# Patient Record
Sex: MALE | Race: BLACK OR AFRICAN AMERICAN | NOT HISPANIC OR LATINO | URBAN - METROPOLITAN AREA
[De-identification: names, ages, dates, MRNs, and addresses within clinical notes are randomized per-mention and may not be internally consistent; named-entity substitution may affect disease eponyms.]

---

## 2017-12-08 ENCOUNTER — EMERGENCY (EMERGENCY)
Facility: HOSPITAL | Age: 32
LOS: 1 days | Discharge: HOME | End: 2017-12-08
Admitting: EMERGENCY MEDICINE
Payer: COMMERCIAL

## 2017-12-08 VITALS
HEART RATE: 81 BPM | DIASTOLIC BLOOD PRESSURE: 76 MMHG | TEMPERATURE: 97 F | RESPIRATION RATE: 16 BRPM | OXYGEN SATURATION: 98 % | SYSTOLIC BLOOD PRESSURE: 119 MMHG

## 2017-12-08 VITALS
DIASTOLIC BLOOD PRESSURE: 74 MMHG | OXYGEN SATURATION: 100 % | SYSTOLIC BLOOD PRESSURE: 122 MMHG | TEMPERATURE: 97 F | HEART RATE: 67 BPM | RESPIRATION RATE: 16 BRPM

## 2017-12-08 LAB
ALBUMIN SERPL ELPH-MCNC: 4.6 G/DL — SIGNIFICANT CHANGE UP (ref 3.3–5)
ALP SERPL-CCNC: 94 U/L — SIGNIFICANT CHANGE UP (ref 40–120)
ALT FLD-CCNC: 22 U/L — SIGNIFICANT CHANGE UP (ref 4–41)
APPEARANCE UR: CLEAR — SIGNIFICANT CHANGE UP
AST SERPL-CCNC: 23 U/L — SIGNIFICANT CHANGE UP (ref 4–40)
BACTERIA # UR AUTO: SIGNIFICANT CHANGE UP
BASOPHILS # BLD AUTO: 0.02 K/UL — SIGNIFICANT CHANGE UP (ref 0–0.2)
BASOPHILS NFR BLD AUTO: 0.3 % — SIGNIFICANT CHANGE UP (ref 0–2)
BILIRUB SERPL-MCNC: 0.3 MG/DL — SIGNIFICANT CHANGE UP (ref 0.2–1.2)
BILIRUB UR-MCNC: NEGATIVE — SIGNIFICANT CHANGE UP
BLOOD UR QL VISUAL: NEGATIVE — SIGNIFICANT CHANGE UP
BUN SERPL-MCNC: 14 MG/DL — SIGNIFICANT CHANGE UP (ref 7–23)
CALCIUM SERPL-MCNC: 9.3 MG/DL — SIGNIFICANT CHANGE UP (ref 8.4–10.5)
CHLORIDE SERPL-SCNC: 103 MMOL/L — SIGNIFICANT CHANGE UP (ref 98–107)
CO2 SERPL-SCNC: 26 MMOL/L — SIGNIFICANT CHANGE UP (ref 22–31)
COLOR SPEC: YELLOW — SIGNIFICANT CHANGE UP
CREAT SERPL-MCNC: 0.96 MG/DL — SIGNIFICANT CHANGE UP (ref 0.5–1.3)
EOSINOPHIL # BLD AUTO: 0.39 K/UL — SIGNIFICANT CHANGE UP (ref 0–0.5)
EOSINOPHIL NFR BLD AUTO: 6.5 % — HIGH (ref 0–6)
GLUCOSE SERPL-MCNC: 90 MG/DL — SIGNIFICANT CHANGE UP (ref 70–99)
GLUCOSE UR-MCNC: NEGATIVE — SIGNIFICANT CHANGE UP
HCT VFR BLD CALC: 40.7 % — SIGNIFICANT CHANGE UP (ref 39–50)
HGB BLD-MCNC: 14 G/DL — SIGNIFICANT CHANGE UP (ref 13–17)
IMM GRANULOCYTES # BLD AUTO: 0.01 # — SIGNIFICANT CHANGE UP
IMM GRANULOCYTES NFR BLD AUTO: 0.2 % — SIGNIFICANT CHANGE UP (ref 0–1.5)
KETONES UR-MCNC: NEGATIVE — SIGNIFICANT CHANGE UP
LEUKOCYTE ESTERASE UR-ACNC: NEGATIVE — SIGNIFICANT CHANGE UP
LYMPHOCYTES # BLD AUTO: 2.69 K/UL — SIGNIFICANT CHANGE UP (ref 1–3.3)
LYMPHOCYTES # BLD AUTO: 44.8 % — HIGH (ref 13–44)
MCHC RBC-ENTMCNC: 31.3 PG — SIGNIFICANT CHANGE UP (ref 27–34)
MCHC RBC-ENTMCNC: 34.4 % — SIGNIFICANT CHANGE UP (ref 32–36)
MCV RBC AUTO: 90.8 FL — SIGNIFICANT CHANGE UP (ref 80–100)
MONOCYTES # BLD AUTO: 0.36 K/UL — SIGNIFICANT CHANGE UP (ref 0–0.9)
MONOCYTES NFR BLD AUTO: 6 % — SIGNIFICANT CHANGE UP (ref 2–14)
MUCOUS THREADS # UR AUTO: SIGNIFICANT CHANGE UP
NEUTROPHILS # BLD AUTO: 2.53 K/UL — SIGNIFICANT CHANGE UP (ref 1.8–7.4)
NEUTROPHILS NFR BLD AUTO: 42.2 % — LOW (ref 43–77)
NITRITE UR-MCNC: NEGATIVE — SIGNIFICANT CHANGE UP
NON-SQ EPI CELLS # UR AUTO: <1 — SIGNIFICANT CHANGE UP
NRBC # FLD: 0 — SIGNIFICANT CHANGE UP
OB PNL STL: NEGATIVE — SIGNIFICANT CHANGE UP
PH UR: 6 — SIGNIFICANT CHANGE UP (ref 4.6–8)
PLATELET # BLD AUTO: 173 K/UL — SIGNIFICANT CHANGE UP (ref 150–400)
PMV BLD: 10.3 FL — SIGNIFICANT CHANGE UP (ref 7–13)
POTASSIUM SERPL-MCNC: 4.1 MMOL/L — SIGNIFICANT CHANGE UP (ref 3.5–5.3)
POTASSIUM SERPL-SCNC: 4.1 MMOL/L — SIGNIFICANT CHANGE UP (ref 3.5–5.3)
PROT SERPL-MCNC: 7.2 G/DL — SIGNIFICANT CHANGE UP (ref 6–8.3)
PROT UR-MCNC: 10 MG/DL — SIGNIFICANT CHANGE UP
RBC # BLD: 4.48 M/UL — SIGNIFICANT CHANGE UP (ref 4.2–5.8)
RBC # FLD: 11.9 % — SIGNIFICANT CHANGE UP (ref 10.3–14.5)
RBC CASTS # UR COMP ASSIST: SIGNIFICANT CHANGE UP (ref 0–?)
SODIUM SERPL-SCNC: 140 MMOL/L — SIGNIFICANT CHANGE UP (ref 135–145)
SP GR SPEC: 1.02 — SIGNIFICANT CHANGE UP (ref 1–1.04)
UROBILINOGEN FLD QL: NORMAL MG/DL — SIGNIFICANT CHANGE UP
WBC # BLD: 6 K/UL — SIGNIFICANT CHANGE UP (ref 3.8–10.5)
WBC # FLD AUTO: 6 K/UL — SIGNIFICANT CHANGE UP (ref 3.8–10.5)
WBC UR QL: SIGNIFICANT CHANGE UP (ref 0–?)

## 2017-12-08 PROCEDURE — 74177 CT ABD & PELVIS W/CONTRAST: CPT | Mod: 26

## 2017-12-08 PROCEDURE — 99284 EMERGENCY DEPT VISIT MOD MDM: CPT

## 2017-12-08 RX ORDER — SODIUM CHLORIDE 9 MG/ML
1000 INJECTION INTRAMUSCULAR; INTRAVENOUS; SUBCUTANEOUS ONCE
Qty: 0 | Refills: 0 | Status: COMPLETED | OUTPATIENT
Start: 2017-12-08 | End: 2017-12-08

## 2017-12-08 RX ADMIN — SODIUM CHLORIDE 1000 MILLILITER(S): 9 INJECTION INTRAMUSCULAR; INTRAVENOUS; SUBCUTANEOUS at 22:20

## 2017-12-08 NOTE — ED PROVIDER NOTE - PLAN OF CARE
PLEASE MAKE SURE THAT YOU FOLLOW UP WITH YOUR DOCTOR AS WELL AS SCHEDULE AN APPOITEMENT TO SEE GASTROENTEROLOGIST AS YOU NEEDS COLONOSCOPY. RETURN TO ER FOR INCREASED BLEEDING, WORSENING ABDOMINAL PAIN, FEVER, CHEST PAIN, SHORTNESS OF BREATH OR ANY CONCERNS.

## 2017-12-08 NOTE — ED PROVIDER NOTE - PROGRESS NOTE DETAILS
NOHEMI Euceda - pt signed out to me by NOHEMI henderson at the shift change. BRPR, occ stool neg, hemodynamically stable, CT abd/pelvis to r/o diverticulitis pending; pt currently sleeping comfortably in the bed; will reassess.

## 2017-12-08 NOTE — ED PROVIDER NOTE - CARE PLAN
Principal Discharge DX:	Bright red blood per rectum  Instructions for follow-up, activity and diet:	PLEASE MAKE SURE THAT YOU FOLLOW UP WITH YOUR DOCTOR AS WELL AS SCHEDULE AN APPOITEMENT TO SEE GASTROENTEROLOGIST AS YOU NEEDS COLONOSCOPY. RETURN TO ER FOR INCREASED BLEEDING, WORSENING ABDOMINAL PAIN, FEVER, CHEST PAIN, SHORTNESS OF BREATH OR ANY CONCERNS.

## 2017-12-08 NOTE — ED PROVIDER NOTE - MEDICAL DECISION MAKING DETAILS
32 yr old male with no pmhx presents c/o LLQ abdominal cramping and bloody BM today. Pt reports that he had some LLQ abdominal cramping and then had bloody BM, with blood in the stool and in toilet. Pt still c/o LLQ pain/ cramping. : cbc, cmp, ua, uc, ct abd/ pelvis, occult blood

## 2017-12-08 NOTE — ED ADULT TRIAGE NOTE - CHIEF COMPLAINT QUOTE
Blood in stool this morning, mixed in stool and water.  Pt has picture of stool to be shown to provider.  Abdominal cramping to lower abdomen since this morning

## 2017-12-08 NOTE — ED ADULT NURSE NOTE - OBJECTIVE STATEMENT
pt. received in intake room 5 , A&Ox5 c/o bright red blood in stool earlier today . Pt. appears comfortable. Pt. Vitals as noted, and in no acute distress. Pt. denies vomiting , states he is slight nauseous but refuses any meds. IV placed on left ac , labs drawn and sent. Will continue to monitor while in the ED.

## 2017-12-08 NOTE — ED PROVIDER NOTE - OBJECTIVE STATEMENT
32 yr old male 32 yr old male with no pmhx presents c/o LLQ abdominal cramping and bloody BM today. Pt reports that he had some LLQ abdominal cramping and then had bloody BM, with blood in the stool and in toilet. Pt still c/o LLQ pain/ cramping. Pt denies any fever, chills, n/v/d, or any other symptoms.

## 2017-12-10 LAB
BACTERIA UR CULT: SIGNIFICANT CHANGE UP
SPECIMEN SOURCE: SIGNIFICANT CHANGE UP

## 2018-10-15 ENCOUNTER — EMERGENCY (EMERGENCY)
Facility: HOSPITAL | Age: 33
LOS: 1 days | Discharge: HOME | End: 2018-10-15
Attending: EMERGENCY MEDICINE | Admitting: EMERGENCY MEDICINE
Payer: COMMERCIAL

## 2018-10-15 VITALS
SYSTOLIC BLOOD PRESSURE: 140 MMHG | HEART RATE: 72 BPM | DIASTOLIC BLOOD PRESSURE: 90 MMHG | TEMPERATURE: 98 F | OXYGEN SATURATION: 100 % | RESPIRATION RATE: 18 BRPM

## 2018-10-15 VITALS
SYSTOLIC BLOOD PRESSURE: 115 MMHG | DIASTOLIC BLOOD PRESSURE: 75 MMHG | TEMPERATURE: 98 F | OXYGEN SATURATION: 100 % | HEART RATE: 59 BPM | RESPIRATION RATE: 19 BRPM

## 2018-10-15 LAB
ALBUMIN SERPL ELPH-MCNC: 4.5 G/DL — SIGNIFICANT CHANGE UP (ref 3.3–5)
ALP SERPL-CCNC: 99 U/L — SIGNIFICANT CHANGE UP (ref 40–120)
ALT FLD-CCNC: 26 U/L — SIGNIFICANT CHANGE UP (ref 4–41)
AST SERPL-CCNC: 23 U/L — SIGNIFICANT CHANGE UP (ref 4–40)
BASOPHILS # BLD AUTO: 0.01 K/UL — SIGNIFICANT CHANGE UP (ref 0–0.2)
BASOPHILS NFR BLD AUTO: 0.2 % — SIGNIFICANT CHANGE UP (ref 0–2)
BILIRUB SERPL-MCNC: 0.3 MG/DL — SIGNIFICANT CHANGE UP (ref 0.2–1.2)
BUN SERPL-MCNC: 14 MG/DL — SIGNIFICANT CHANGE UP (ref 7–23)
CALCIUM SERPL-MCNC: 9.7 MG/DL — SIGNIFICANT CHANGE UP (ref 8.4–10.5)
CHLORIDE SERPL-SCNC: 100 MMOL/L — SIGNIFICANT CHANGE UP (ref 98–107)
CO2 SERPL-SCNC: 23 MMOL/L — SIGNIFICANT CHANGE UP (ref 22–31)
CREAT SERPL-MCNC: 0.95 MG/DL — SIGNIFICANT CHANGE UP (ref 0.5–1.3)
EOSINOPHIL # BLD AUTO: 0.36 K/UL — SIGNIFICANT CHANGE UP (ref 0–0.5)
EOSINOPHIL NFR BLD AUTO: 7.6 % — HIGH (ref 0–6)
GLUCOSE SERPL-MCNC: 102 MG/DL — HIGH (ref 70–99)
HCT VFR BLD CALC: 41.8 % — SIGNIFICANT CHANGE UP (ref 39–50)
HGB BLD-MCNC: 14.4 G/DL — SIGNIFICANT CHANGE UP (ref 13–17)
IMM GRANULOCYTES # BLD AUTO: 0.01 # — SIGNIFICANT CHANGE UP
IMM GRANULOCYTES NFR BLD AUTO: 0.2 % — SIGNIFICANT CHANGE UP (ref 0–1.5)
LYMPHOCYTES # BLD AUTO: 1.93 K/UL — SIGNIFICANT CHANGE UP (ref 1–3.3)
LYMPHOCYTES # BLD AUTO: 40.8 % — SIGNIFICANT CHANGE UP (ref 13–44)
MCHC RBC-ENTMCNC: 31.2 PG — SIGNIFICANT CHANGE UP (ref 27–34)
MCHC RBC-ENTMCNC: 34.4 % — SIGNIFICANT CHANGE UP (ref 32–36)
MCV RBC AUTO: 90.7 FL — SIGNIFICANT CHANGE UP (ref 80–100)
MONOCYTES # BLD AUTO: 0.27 K/UL — SIGNIFICANT CHANGE UP (ref 0–0.9)
MONOCYTES NFR BLD AUTO: 5.7 % — SIGNIFICANT CHANGE UP (ref 2–14)
NEUTROPHILS # BLD AUTO: 2.15 K/UL — SIGNIFICANT CHANGE UP (ref 1.8–7.4)
NEUTROPHILS NFR BLD AUTO: 45.5 % — SIGNIFICANT CHANGE UP (ref 43–77)
NRBC # FLD: 0.02 — SIGNIFICANT CHANGE UP
PLATELET # BLD AUTO: 172 K/UL — SIGNIFICANT CHANGE UP (ref 150–400)
PMV BLD: 10.3 FL — SIGNIFICANT CHANGE UP (ref 7–13)
POTASSIUM SERPL-MCNC: 4.4 MMOL/L — SIGNIFICANT CHANGE UP (ref 3.5–5.3)
POTASSIUM SERPL-SCNC: 4.4 MMOL/L — SIGNIFICANT CHANGE UP (ref 3.5–5.3)
PROT SERPL-MCNC: 7.4 G/DL — SIGNIFICANT CHANGE UP (ref 6–8.3)
RBC # BLD: 4.61 M/UL — SIGNIFICANT CHANGE UP (ref 4.2–5.8)
RBC # FLD: 11.7 % — SIGNIFICANT CHANGE UP (ref 10.3–14.5)
SODIUM SERPL-SCNC: 137 MMOL/L — SIGNIFICANT CHANGE UP (ref 135–145)
WBC # BLD: 4.73 K/UL — SIGNIFICANT CHANGE UP (ref 3.8–10.5)
WBC # FLD AUTO: 4.73 K/UL — SIGNIFICANT CHANGE UP (ref 3.8–10.5)

## 2018-10-15 PROCEDURE — 99284 EMERGENCY DEPT VISIT MOD MDM: CPT | Mod: 25

## 2018-10-15 PROCEDURE — 70450 CT HEAD/BRAIN W/O DYE: CPT | Mod: 26

## 2018-10-15 RX ORDER — SODIUM CHLORIDE 9 MG/ML
1000 INJECTION INTRAMUSCULAR; INTRAVENOUS; SUBCUTANEOUS ONCE
Qty: 0 | Refills: 0 | Status: COMPLETED | OUTPATIENT
Start: 2018-10-15 | End: 2018-10-15

## 2018-10-15 RX ORDER — METOCLOPRAMIDE HCL 10 MG
10 TABLET ORAL ONCE
Qty: 0 | Refills: 0 | Status: COMPLETED | OUTPATIENT
Start: 2018-10-15 | End: 2018-10-15

## 2018-10-15 RX ORDER — KETOROLAC TROMETHAMINE 30 MG/ML
15 SYRINGE (ML) INJECTION ONCE
Qty: 0 | Refills: 0 | Status: DISCONTINUED | OUTPATIENT
Start: 2018-10-15 | End: 2018-10-15

## 2018-10-15 RX ADMIN — Medication 15 MILLIGRAM(S): at 04:19

## 2018-10-15 RX ADMIN — Medication 10 MILLIGRAM(S): at 04:20

## 2018-10-15 RX ADMIN — SODIUM CHLORIDE 1000 MILLILITER(S): 9 INJECTION INTRAMUSCULAR; INTRAVENOUS; SUBCUTANEOUS at 04:20

## 2018-10-15 NOTE — ED ADULT TRIAGE NOTE - CHIEF COMPLAINT QUOTE
c/o severe headache which started around this afternoon but now is 10/10. c/o nausea. Denies photophobia or fever. Had hit his head onto a table last week. Pt is uncomfortable in triage. c/o severe headache which started around this afternoon but now is 10/10. c/o nausea. Denies photophobia or fever. Had hit his head onto a table last week. Pt is uncomfortable in triage. took Ibuprofen around 7pm and two aleve around 0145 with no relief. No PMH

## 2018-10-15 NOTE — ED PROVIDER NOTE - PLAN OF CARE
Thank you for visiting our Emergency Department, it has been a pleasure taking part in your healthcare.   You have been seen for a headache. Your results showed that you may have had a migraine. Your CT results and blood work were normal.  You were treated with Toradol and Reglan.  A copy of resulted labs, imaging, and findings have been provided to you.     Follow up with your primary care provider in 24-48 hours. We have provided you with a list of numbers for neurology.   Please return to the Emergency Department if you experience any new/worsening symptoms, including but are not limited to: unrelenting nausea, vomiting, fever, chills, dizziness, syncope, headache that does not resolve, loss of sensation, loss of motor function, or any other concerning symptoms.

## 2018-10-15 NOTE — ED PROVIDER NOTE - PROGRESS NOTE DETAILS
EM PGY1 Nisa Phillips MD: Pt assessed at beside. Pt resting comfortably, pain controlled, pt questions answered. Vital signs stable. Will d/c with PCP/neurology f/u. Follow up instructions given, importance of follow up emphasized, return to ED parameters reviewed and patient verbalized understanding.  All questions answered, all concerns addressed.

## 2018-10-15 NOTE — ED ADULT NURSE NOTE - OBJECTIVE STATEMENT
34 y/o M received in spot 5.  Pt is A&Ox4.  Pt c/o R temporal HA x 2 days not relieved with aleve or Motrin.  Denies fever/chills, N/V/D, dizziness, weakness.  fvr, neck pain, photophobia, or blurry vision.  18 R Ac.

## 2018-10-15 NOTE — ED PROVIDER NOTE - ATTENDING CONTRIBUTION TO CARE
pt with R sided HA frontal/temporal lesion - gradual onset and got significantly worse through the course of the day and became intolerable.  No photophobia and not sudden onset.  No weakness.    Gen: Well appearing in NAD  Head: NC/AT  Neck: trachea midline  Resp:  No distress  Ext: no deformities  Neuro:  A&O appears non focal  Skin:  Warm and dry as visualized  Psych:  Normal affect and mood    Pt with HA.  No real history of HA but no red flags for SAH.  Will treat symptomatically and if symptoms persist discuss imagining with patient,

## 2018-10-15 NOTE — ED ADULT NURSE NOTE - CHIEF COMPLAINT QUOTE
c/o severe headache which started around this afternoon but now is 10/10. c/o nausea. Denies photophobia or fever. Had hit his head onto a table last week. Pt is uncomfortable in triage. took Ibuprofen around 7pm and two aleve around 0145 with no relief. No PMH

## 2018-10-15 NOTE — ED PROVIDER NOTE - OBJECTIVE STATEMENT
33y F no hx p/w gradual onset unprovoked nonrad R temporal HA not responsive to Aleve at 3pm & ibuprofen at 7pm.  No fvr, neck pain, photophobia, phonophobia, tearing.  +intermittent nausea or R blurry vision, but none now. No balance issues.

## 2018-10-15 NOTE — ED PROVIDER NOTE - CARE PLAN
Principal Discharge DX:	Migraine  Assessment and plan of treatment:	Thank you for visiting our Emergency Department, it has been a pleasure taking part in your healthcare.   You have been seen for a headache. Your results showed that you may have had a migraine. Your CT results and blood work were normal.  You were treated with Toradol and Reglan.  A copy of resulted labs, imaging, and findings have been provided to you.     Follow up with your primary care provider in 24-48 hours. We have provided you with a list of numbers for neurology.   Please return to the Emergency Department if you experience any new/worsening symptoms, including but are not limited to: unrelenting nausea, vomiting, fever, chills, dizziness, syncope, headache that does not resolve, loss of sensation, loss of motor function, or any other concerning symptoms.

## 2019-05-07 NOTE — ED ADULT NURSE NOTE - NS ED NOTE ABUSE RESPONSE YN
Past Medical History:   Diagnosis Date    Diabetes mellitus     High cholesterol     Hypertension     Prostate cancer        Past Surgical History:   Procedure Laterality Date    APPLICATION, GRAFT, SKIN, FULL-THICKNESS, TO UPPER EXTREMITY VS STSG WITH FROZEN SECTIONS Right 11/15/2018    [facility]    EXCISION LEFT POST AURICULAR SQUAMOUS CELL CANCER  WITH LOCAL TISSUE ARRANGEMENT Left 11/15/2018    [facility]    EXCISION, CARCINOMA, SQUAMOUS CELL @ RIGHT HAND Right 11/15/2018    [facility] OR    JOINT REPLACEMENT      TONSILLECTOMY      TOTAL KNEE ARTHROPLASTY         Review of patient's allergies indicates:  No Known Allergies    No current facility-administered medications on file prior to encounter.      Current Outpatient Medications on File Prior to Encounter   Medication Sig    AMITIZA 24 mcg Cap 24 mcg 2 (two) times daily with meals.     aspirin (ECOTRIN) 81 MG EC tablet Take 81 mg by mouth once daily.    ezetimibe (ZETIA) 10 mg tablet Take 1 tablet (10 mg total) by mouth every evening.    hydroCHLOROthiazide (MICROZIDE) 12.5 mg capsule     metformin (GLUCOPHAGE) 1000 MG tablet Take 1 tablet (1,000 mg total) by mouth 2 (two) times daily with meals.    metoprolol succinate (TOPROL-XL) 25 MG 24 hr tablet Take 1 tablet (25 mg total) by mouth once daily.    simvastatin (ZOCOR) 20 MG tablet      Family History     None        Tobacco Use    Smoking status: Former Smoker     Packs/day: 0.00     Types: Cigarettes     Last attempt to quit: 2016     Years since quittin.7    Smokeless tobacco: Never Used    Tobacco comment: 2016   Substance and Sexual Activity    Alcohol use: No    Drug use: No    Sexual activity: Not Currently     Review of Systems   Constitution: Negative.   HENT: Negative.    Eyes: Negative.    Respiratory: Negative.    Endocrine: Negative.     Hematologic/Lymphatic: Negative.    Skin: Negative.    Musculoskeletal: Negative.    Gastrointestinal: Negative.    Genitourinary: Negative.    Neurological: Negative.    Psychiatric/Behavioral: Negative.    Allergic/Immunologic: Negative.      Objective:     Vital Signs (Most Recent):  Temp: 98.2 °F (36.8 °C) (05/07/19 0812)  Pulse: (!) 58 (05/07/19 0812)  Resp: 20 (05/07/19 0812)  BP: (!) 148/67 (05/07/19 0812)  SpO2: 98 % (05/07/19 0812) Vital Signs (24h Range):  Temp:  [97.5 °F (36.4 °C)-98.6 °F (37 °C)] 98.2 °F (36.8 °C)  Pulse:  [40-73] 58  Resp:  [16-25] 20  SpO2:  [96 %-99 %] 98 %  BP: (102-188)/(52-78) 148/67     Weight: 81.6 kg (180 lb)  Body mass index is 26.58 kg/m².    SpO2: 98 %  O2 Device (Oxygen Therapy): room air      Intake/Output Summary (Last 24 hours) at 5/7/2019 1034  Last data filed at 5/7/2019 0850  Gross per 24 hour   Intake 580 ml   Output --   Net 580 ml       Lines/Drains/Airways     Peripheral Intravenous Line                 Peripheral IV - Single Lumen 05/06/19 0735 20 G Right Forearm 1 day                Physical Exam   Constitutional: He is oriented to person, place, and time. He appears well-developed and well-nourished.   HENT:   Head: Normocephalic.   Eyes: Pupils are equal, round, and reactive to light. Conjunctivae are normal.   Neck: Normal range of motion. Neck supple.   Cardiovascular: Normal rate, regular rhythm and normal heart sounds.   Pulmonary/Chest: Effort normal and breath sounds normal.   Abdominal: Soft. Bowel sounds are normal.   Neurological: He is alert and oriented to person, place, and time.   Skin: Skin is warm.   Nursing note and vitals reviewed.      Significant Labs:   BMP:   Recent Labs   Lab 05/06/19  0735 05/07/19  0610   * 102    138   K 3.8 4.0    104   CO2 28 28   BUN 13 13   CREATININE 1.2 0.9   CALCIUM 9.6 9.2   MG  --  2.0   , CMP   Recent Labs   Lab 05/06/19  0735 05/07/19  0610    138   K 3.8 4.0    104   CO2 28  28   * 102   BUN 13 13   CREATININE 1.2 0.9   CALCIUM 9.6 9.2   PROT 7.2  --    ALBUMIN 3.9  --    BILITOT 0.6  --    ALKPHOS 72  --    AST 15  --    ALT 16  --    ANIONGAP 9 6*   ESTGFRAFRICA >60 >60   EGFRNONAA 56* >60   , CBC   Recent Labs   Lab 05/06/19  0735 05/06/19  2158 05/07/19  0610   WBC 7.68 9.55 8.08   HGB 15.1 14.0 14.7   HCT 44.2 42.1 44.8    259 269   , Lipid Panel   Recent Labs   Lab 05/06/19  1045   CHOL 134   HDL 45   LDLCALC 73.0   TRIG 80   CHOLHDL 33.6   , Troponin   Recent Labs   Lab 05/06/19  1045 05/06/19  1615 05/06/19  2158   TROPONINI 0.113*  0.095* 0.091* 0.075*    and All pertinent lab results from the last 24 hours have been reviewed.    Significant Imaging:  EKG:  Normal sinus rhythm, left axis deviation         Yes

## 2020-01-27 NOTE — ED PROVIDER NOTE - NS ED MD DISPO DISCHARGE CCDA
Spoke with pt.    Advised that I will discuss his request for another work letter stating that he will need to be out of work for an additional 4-6 wks with Dr Hammond     Patient/Caregiver provided printed discharge information.

## 2022-08-19 ENCOUNTER — NON-APPOINTMENT (OUTPATIENT)
Age: 37
End: 2022-08-19

## 2024-05-02 NOTE — ED PROVIDER NOTE - NS ED MD DISPO DISCHARGE CCDA
PATIENT HISTORY:    Phillip Rueda is a 45 year old male with history of type 2 diabetes, peripheral neuropathy, depression, hypertension, anxiety, chronic right shoulder pain, cellulitis who presents to the ED for evaluation of right toe pain.  Patient states that he developed pain in his right toe a couple of nights ago.   Swelling has extended up his foot so he contacted Dr Shelton Smallwood who suggested that he go to the ED for further evaluation.  He denies recent trauma or injuries to the foot.  He states that he has had a bone infection in the right toe couple years ago and was hospitalized for IV antibiotics.  States that this was at the [facility].  He denies any recent fevers.  He states that his diabetes is currently well-controlled on Ozempic and metformin.   I was asked to see Phililp Rueda  by Jasiel Pickard DO for right great toe infection.  He did have a abscess/blister lanced and cultured on this admission.    Review of Systems:  Patient denies fever, chills, rash, wound, stiffness, limping, numbness, weakness, heart burn, blood in stool, chest pain with activity, calf pain when walking, shortness of breath with activity, chronic cough, easy bleeding/bruising, swelling of ankles, excessive thirst, fatigue, depression, anxiety.  Patient admits to redness/swelling and pain in the right great toe.     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     Prostate infection     Seizures (H)     Staph infection         PAST SURGICAL HISTORY:   Past Surgical History:   Procedure Laterality Date    BIOPSY BONE FOOT Right 1/18/2022    Procedure: BIOPSY, BONE, RIGHT GREAT TOE (DISTAL PHALANX);  Surgeon: Shelton Smallwood DPM;  Location: [facility] OR    ORTHOPEDIC SURGERY          MEDICATIONS:   Current  Facility-Administered Medications:     acetaminophen (TYLENOL) tablet 650 mg, 650 mg, Oral, Q4H PRN **OR** acetaminophen (TYLENOL) Suppository 650 mg, 650 mg, Rectal, Q4H PRN, Jasiel Guillen DO    atenolol (TENORMIN) tablet 25 mg, 25 mg, Oral, Daily, Jasiel Guillen DO, 25 mg at 05/02/24 0931    atorvastatin (LIPITOR) tablet 20 mg, 20 mg, Oral, Daily, Jasiel Guillen DO, 20 mg at 05/02/24 0931    buprenorphine HCl-naloxone HCl (SUBOXONE) 8-2 MG per film 1 Film, 1 Film, Sublingual, Daily, Jasiel Guillen DO, 1 Film at 05/02/24 0931    calcium carbonate (TUMS) chewable tablet 1,000 mg, 1,000 mg, Oral, 4x Daily PRN, Jasiel Guillen DO    ceFEPIme (MAXIPIME) 2 g vial to attach to  mL bag for ADULTS or 50 mL bag for PEDS, 2 g, Intravenous, Q12H, Jasiel Guillen DO, 2 g at 05/02/24 0932    glucose gel 15-30 g, 15-30 g, Oral, Q15 Min PRN **OR** dextrose 50 % injection 25-50 mL, 25-50 mL, Intravenous, Q15 Min PRN **OR** glucagon injection 1 mg, 1 mg, Subcutaneous, Q15 Min PRN, Jasiel Guillen DO    diazepam (VALIUM) tablet 5 mg, 5 mg, Oral, Q8H PRN, Jasiel Guillen DO    gabapentin (NEURONTIN) capsule 600 mg, 600 mg, Oral, BID, Jasiel Guillen DO, 600 mg at 05/02/24 0931    insulin aspart (NovoLOG) injection (RAPID ACTING), 1-7 Units, Subcutaneous, TID AC, Jasiel Guillen DO    insulin aspart (NovoLOG) injection (RAPID ACTING), 1-5 Units, Subcutaneous, At Bedtime, Jasiel Guillen DO    lidocaine (LMX4) cream, , Topical, Q1H PRN, Jasiel Guillen DO    lidocaine 1 % 0.1-1 mL, 0.1-1 mL, Other, Q1H PRN, Jasiel Guillen DO    nortriptyline (PAMELOR) capsule 25 mg, 25 mg, Oral, At Bedtime, Jasiel Guillen DO, 25 mg at 05/02/24 0448    ondansetron (ZOFRAN ODT) ODT tab 4 mg, 4 mg, Oral, Q6H PRN **OR** ondansetron (ZOFRAN) injection 4 mg, 4 mg, Intravenous, Q6H PRN, Jasiel Guillen DO    polyethylene glycol (MIRALAX) Packet 17 g, 17 g, Oral, BID PRN, Jasiel Guillen DO    prochlorperazine (COMPAZINE) injection 10 mg, 10 mg,  Intravenous, Q6H PRN **OR** prochlorperazine (COMPAZINE) tablet 10 mg, 10 mg, Oral, Q6H PRN **OR** prochlorperazine (COMPAZINE) suppository 25 mg, 25 mg, Rectal, Q12H PRN, Jasiel Guillen DO    senna-docusate (SENOKOT-S/PERICOLACE) 8.6-50 MG per tablet 1 tablet, 1 tablet, Oral, BID PRN **OR** senna-docusate (SENOKOT-S/PERICOLACE) 8.6-50 MG per tablet 2 tablet, 2 tablet, Oral, BID PRN, Jasiel Guillen DO    sodium chloride (PF) 0.9% PF flush 3 mL, 3 mL, Intracatheter, Q8H, Jasiel Guillen DO, 3 mL at 05/02/24 0946    sodium chloride (PF) 0.9% PF flush 3 mL, 3 mL, Intracatheter, q1 min prn, Jasiel Guillen DO    vancomycin (VANCOCIN) 1,000 mg in 200 mL dextrose intermittent infusion, 1,000 mg, Intravenous, Q12H, Jasiel Guillen DO    venlafaxine (EFFEXOR XR) 24 hr capsule 150 mg, 150 mg, Oral, Daily with breakfast, Jasiel Guillen DO, 150 mg at 05/02/24 0931     ALLERGIES:    Allergies   Allergen Reactions    Bees Swelling    Bupropion Hcl Other (See Comments)     seizure    Lisinopril Cough    Penicillins Other (See Comments)     Unable to close mouth  Tolerated cefazolin (12/12/21)        SOCIAL HISTORY:   Social History     Socioeconomic History    Marital status:      Spouse name: Not on file    Number of children: Not on file    Years of education: Not on file    Highest education level: Not on file   Occupational History    Not on file   Tobacco Use    Smoking status: Former     Current packs/day: 0.00     Average packs/day: 0.3 packs/day for 17.0 years (4.3 ttl pk-yrs)     Types: Cigarettes, Dip, chew, snus or snuff     Start date: 7/1/1996     Quit date: 7/1/2013     Years since quitting: 10.8    Smokeless tobacco: Former   Vaping Use    Vaping status: Never Used   Substance and Sexual Activity    Alcohol use: No     Alcohol/week: 0.0 standard drinks of alcohol     Comment: sober 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  Drink 1.75 every 2 days    Drug use: No    Sexual activity: Yes     Partners: Female     Birth  control/protection: None     Comment: Trying to get my wife pregnant   Other Topics Concern    Parent/sibling w/ CABG, MI or angioplasty before 65F 55M? Yes     Comment: My dads dad  of heart issues in his 40's   Social History Narrative    Not on file     Social Determinants of Health     Financial Resource Strain: High Risk (2024)    Financial Resource Strain     Within the past 12 months, have you or your family members you live with been unable to get utilities (heat, electricity) when it was really needed?: Yes   Food Insecurity: Low Risk  (2024)    Food Insecurity     Within the past 12 months, did you worry that your food would run out before you got money to buy more?: No     Within the past 12 months, did the food you bought just not last and you didn t have money to get more?: No   Transportation Needs: Low Risk  (2024)    Transportation Needs     Within the past 12 months, has lack of transportation kept you from medical appointments, getting your medicines, non-medical meetings or appointments, work, or from getting things that you need?: No   Physical Activity: Not on file   Stress: Not on file   Social Connections: Unknown (2021)    Received from [facility], [facility]    Social Connections     Frequency of Communication with Friends and Family: Not on file   Interpersonal Safety: Low Risk  (2024)    Interpersonal Safety     Do you feel physically and emotionally safe where you currently live?: Yes     Within the past 12 months, have you been hit, slapped, kicked or otherwise physically hurt by someone?: No     Within the past 12 months, have you been humiliated or emotionally abused in other ways by your partner or ex-partner?: No   Housing Stability: Low Risk  (2024)    Housing Stability     Do you have housing? : Yes     Are you worried about losing your housing?: No        FAMILY HISTORY:   Family  "History   Problem Relation Age of Onset    Depression Mother     Anxiety Disorder Mother     Substance Abuse Maternal Grandfather     Anxiety Disorder Brother     Glaucoma No family hx of     Macular Degeneration No family hx of         EXAM:Vitals: BP (!) 141/80 (BP Location: Right arm)   Pulse 80   Temp 97.9  F (36.6  C) (Oral)   Resp 16   Ht 1.727 m (5' 8\")   Wt 72.6 kg (160 lb)   SpO2 96%   BMI 24.33 kg/m    BMI= Body mass index is 24.33 kg/m .    LABS:    WBC   Date Value Ref Range Status   11/02/2020 8.3 4.0 - 11.0 10e9/L Final     WBC Count   Date Value Ref Range Status   05/01/2024 6.9 4.0 - 11.0 10e3/uL Final        HA1C: 6.2(1/9/24)    CRP: 30.52    General appearance: Patient is alert and fully cooperative with history & exam.  No sign of distress is noted during the visit.      Psychiatric: Affect is pleasant & appropriate.  Patient appears motivated to improve health.       Respiratory: Breathing is regular & unlabored while sitting.      HEENT: Hearing is intact to spoken word.  Speech is clear.  No gross evidence of visual impairment that would impact ambulation.       Dermatologic: Right foot with erythema/edema to the first toe that extends towards the MPJ region.  Lessening from what was marked on the top of the foot.  Hypekeratotic tissue/blister on the dorsal medial distal first digit.  Slight serousanguinous drainage.  No malodor noted.  Pain with palpation.      Vascular: DP & PT pulses are intact & regular bilaterally.  No significant edema or varicosities noted.  CFT and skin temperature is normal to both lower extremities.       Neurologic: Lower extremity sensation is diminished distally to light touch.  No evidence of weakness or contracture in the lower extremities.     Musculoskeletal: Patient is ambulatory without assistive device or brace.  No gross ankle deformity noted.  No foot or ankle joint effusion is noted.      IMAGING: I personally reviewed images.      MRI RIGHT " FOOT  1.  Blisterlike superficial nonenhancing fluid collection upper level of the skin in the medial aspect of the first toe, at the level of the distal phalanx, measuring 3.0 x 1.6 x 0.7 cm.     2.  Nonspecific soft tissue edema and postcontrast enhancement in the distal aspect of the toe suggests cellulitis. No phlegmon or abscess.     3.  Moderate bone marrow edema in the distal phalanx of the first toe, suggests reactive osteitis. No overt osteomyelitis. No evidence of septic arthritis or septic tenosynovitis.     4.  Previously seen abnormal bone marrow signal in the distal phalanx of the second toe has resolved. The second toe bones and joints appear normal.     5.  Chronic atrophy of the intrinsic foot musculature, and edema-like T2 signal intensity within the muscles in the superficial soft tissues over the dorsum of the foot, within expected limits for diabetic change.    CULTURES:  Superficial bedside culture Right great toe blister/abscess 5/1/24  3+ Gram positive cocci      1+ WBC seen        ASSESSMENT: 45 year diabetic male patient with purulent blister and cellulitis right great toe, osteitis vs osteomyelitis of the distal phalanx .     PLAN:  Reviewed patient's chart in [facility].  Continue antibiotics per hospital team - Cefepime/Vancomycin  Dry dressing applied at bedside. - keep clean, dry and intact  Heel Weightbearing as tolerated in surgical shoe - order placed  Discussed findings and treatment options with the patient.  Surgery tomorrow with Dr Shelton Smallwood for I&D right first toe with bone biopsy at 1:15pm  NPO after midnight        Syeda Rowan DPM    10:33 AM    Patient/Caregiver provided printed discharge information.

## 2024-09-11 NOTE — ED ADULT TRIAGE NOTE - BP NONINVASIVE SYSTOLIC (MM HG)
Pt here for lanreotide. Injection given in R glute. Pt tolerated tx well with no complaints at this time. Next appt confirmed for 10/9 at 0830 at [place]. Declined AVS.   140